# Patient Record
Sex: FEMALE | Race: OTHER | NOT HISPANIC OR LATINO
[De-identification: names, ages, dates, MRNs, and addresses within clinical notes are randomized per-mention and may not be internally consistent; named-entity substitution may affect disease eponyms.]

---
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
## 2023-02-09 NOTE — ED PROVIDER NOTE - CROS ED NEURO POS
HEADACHE
[NS_DeliveryAttending1_OBGYN_ALL_OB_FT:[id number]],[NS_DeliveryAssist1_OBGYN_ALL_OB_FT:[id number]],[NS_DeliveryRN_OBGYN_ALL_OB_FT:[id number]]

## 2024-02-10 ENCOUNTER — EMERGENCY (EMERGENCY)
Facility: HOSPITAL | Age: 19
LOS: 1 days | Discharge: HOME | End: 2024-02-10
Attending: EMERGENCY MEDICINE
Payer: MEDICAID

## 2024-02-10 VITALS
TEMPERATURE: 98 F | DIASTOLIC BLOOD PRESSURE: 72 MMHG | HEIGHT: 65 IN | SYSTOLIC BLOOD PRESSURE: 109 MMHG | RESPIRATION RATE: 18 BRPM | HEART RATE: 80 BPM | WEIGHT: 151.24 LBS | OXYGEN SATURATION: 99 %

## 2024-02-10 PROCEDURE — 99284 EMERGENCY DEPT VISIT MOD MDM: CPT

## 2024-02-10 PROCEDURE — 99283 EMERGENCY DEPT VISIT LOW MDM: CPT

## 2024-02-10 RX ORDER — IBUPROFEN 200 MG
1 TABLET ORAL
Qty: 15 | Refills: 0
Start: 2024-02-10 | End: 2024-02-14

## 2024-02-10 RX ORDER — OXYCODONE AND ACETAMINOPHEN 5; 325 MG/1; MG/1
1 TABLET ORAL
Qty: 8 | Refills: 0
Start: 2024-02-10 | End: 2024-02-11

## 2024-02-10 RX ADMIN — Medication 1 TABLET(S): at 18:29

## 2024-02-10 NOTE — ED ADULT NURSE NOTE - OBJECTIVE STATEMENT
Patient presented to RD for tooth pain x two months, right side of tooth pain gotten worse, "feeling like right mouth pain"

## 2024-02-10 NOTE — ED PROVIDER NOTE - PATIENT PORTAL LINK FT
You can access the FollowMyHealth Patient Portal offered by [facility] by registering at the following website: [email or web address]. By joining [facility]’s FollowMyHealth portal, you will also be able to view your health information using other applications (apps) compatible with our system.

## 2024-02-10 NOTE — ED PROVIDER NOTE - OBJECTIVE STATEMENT
18-year-old comes in complaining of tooth ache right-sided upper molars.  States that she has been having pain for 2 months and now got worse.  Is looking for a dentist.

## 2024-02-10 NOTE — ED ADULT TRIAGE NOTE - BSA (M2)
1.76 Benzoyl Peroxide Counseling: Patient counseled that medicine may cause skin irritation and bleach clothing.  In the event of skin irritation, the patient was advised to reduce the amount of the drug applied or use it less frequently.   The patient verbalized understanding of the proper use and possible adverse effects of benzoyl peroxide.  All of the patient's questions and concerns were addressed.

## 2024-02-10 NOTE — ED PROVIDER NOTE - CLINICAL SUMMARY MEDICAL DECISION MAKING FREE TEXT BOX
18-year-old comes in complaining of tooth ache right-sided upper molars.  States that she has been having pain for 2 months and now got worse.  Is looking for a dentist. ho facial swelling , no abscess   tenderness to percussion   abs analgesia

## 2025-06-27 NOTE — ED ADULT TRIAGE NOTE - STATUS:
Hypotensive following PEA arrest on 6/23    - Suspect multifactorial shock, hemorrhagic +/- component of vasoplegia 2/2 cardiac arrest, may have component of cardiogenic 2/2 suspected massive PE  - TTE, ECG, trop reviewed  - Trend lactate q6hrs -> normalized, stop trending  - Wean levophed gtt as tolerated for goal MAP>65, SBP<160 -> off levophed/vasopressor support at this time    No evidence of infection prior to procedure, low clinical suspicion for septic shock at this time   Applied